# Patient Record
Sex: MALE | Race: BLACK OR AFRICAN AMERICAN
[De-identification: names, ages, dates, MRNs, and addresses within clinical notes are randomized per-mention and may not be internally consistent; named-entity substitution may affect disease eponyms.]

---

## 2018-10-04 ENCOUNTER — HOSPITAL ENCOUNTER (EMERGENCY)
Dept: HOSPITAL 80 - FED | Age: 68
Discharge: HOME | End: 2018-10-04
Payer: COMMERCIAL

## 2018-10-04 VITALS — SYSTOLIC BLOOD PRESSURE: 160 MMHG | DIASTOLIC BLOOD PRESSURE: 78 MMHG

## 2018-10-04 DIAGNOSIS — Z87.891: ICD-10-CM

## 2018-10-04 DIAGNOSIS — R74.8: ICD-10-CM

## 2018-10-04 DIAGNOSIS — I10: ICD-10-CM

## 2018-10-04 DIAGNOSIS — J40: Primary | ICD-10-CM

## 2018-10-04 LAB — PLATELET # BLD: 182 10^3/UL (ref 150–400)

## 2018-10-04 PROCEDURE — 71046 X-RAY EXAM CHEST 2 VIEWS: CPT

## 2018-10-04 PROCEDURE — 99285 EMERGENCY DEPT VISIT HI MDM: CPT

## 2018-10-04 PROCEDURE — 93005 ELECTROCARDIOGRAM TRACING: CPT

## 2018-10-04 NOTE — CPEKG
Test Reason : OPEN

Blood Pressure : ***/*** mmHG

Vent. Rate : 052 BPM     Atrial Rate : 051 BPM

   P-R Int : 145 ms          QRS Dur : 109 ms

    QT Int : 410 ms       P-R-T Axes : 076 062 058 degrees

   QTc Int : 382 ms

 

Sinus rhythm

Probable left atrial enlargement

Left ventricular hypertrophy

 

 

Confirmed by Irlanda Carpenter ([id number]) on 10/4/2018 4:10:53 PM

 

Referred By:             Confirmed By:Irlanda Carpenter

## 2018-10-04 NOTE — EDPHY
HPI/HX/ROS/PE/MDM


Narrative: 


CHIEF COMPLAINT: Chest pain





HISTORY OF PRESENT ILLNESS: 


The patient is a 69 y/o male with a history of hypertension complaining of right

-sided chest pain onset yesterday morning. After the chest pain started he took 

two Aspirin and then subsequently slept all day yesterday and last night, which 

is abnormal. The pain is currently localized to the right side of his chest and 

is a 6/10. He occasionally has mild shortness of breath. While walking around 

he feels mildly lightheaded. The pain does not change when he takes a deep 

breath. Denies history of DVT or PE, denies smoking although he did smoke 

cigars 15 years ago. He has also had right lower jaw pain for the last several 

months, which he has seen a dentist for. He denies taking antibiotics for the 

dental pain.





No fever, chills, palpitations, vomiting, diarrhea, urinary complaints, 

headache. 





REVIEW OF SYSTEMS:


Aside from elements discussed in the HPI, a comprehensive 10-system review of 

systems was reviewed and is negative.





PAST MEDICAL HISTORY: Hypertension, BPH  





SOCIAL HISTORY: Friend at bedside, employed, former cigar smoker





VITAL SIGNS: Reviewed by me


GENERAL: Well-developed, well-nourished, resting comfortably in no respiratory 

distress.


HEENT: Atraumatic. Eyes: No icterus, no injection. Mouth: moist mucous 

membranes.  No erythema or lesions. Neck: supple with no adenopathy.


LUNGS: Clear to auscultation bilaterally, no wheezes, rhonchi or rales.


CARDIAC: Regular rate and rhythm, no rubs, murmurs or gallops.


ABDOMEN: Soft, nontender, nondistended, bowel sounds normal.


BACK:  No CVA tenderness.


EXTREMITIES: No trauma. No edema.  Range of motion is normal throughout.


NEURO: Alert and oriented,  grossly nonfocal.  


SKIN: Warm and dry, no rash.


PSYCHIATRIC: Normal mentation, no agitation.





Portions of this note were transcribed by a medical scribe.  I personally 

performed a history, physical exam, medical decision making, and confirmed 

accuracy of information the transcribed note.





ED Course: 


The patient is a 69 y/o male with a history of hypertension complaining of right

-sided chest pain and fatigue onset yesterday morning. He has a normal exam 

including a regular heart and clear lungs. Labs, EKG, and chest x-ray ordered; 

324mg PO Aspirin administered.





0918: 12-LEAD EKG:  Please see the full report in [facility].  My 

interpretation: Sinus rhythm with a rate of 52, probable left atrial enlargement

, left ventricular hypertrophy.  No significant ST or T-wave changes.





1049: I reviewed patient's chest x-ray which reveals a bronchitis. Radiologist 

reading still pending.





1147: I spoke with the radiologist who agrees with my findings of bronchitis. 

Albuterol nebulizer administered prior to discharge.





Reassessed patient and discussed laboratory and imaging findings. I have 

prescribed him an albuterol inhaler. Return precautions provided; patient is 

comfortable with this plan. 





68-year-old male with history of hypertension with right-sided pain.  Pain is 

been present since yesterday.  He does report a mild URI as well.  Troponin 

negative.  D-dimer negative.  Chest x-ray demonstrates mild bronchitis.  

Patient better following a neb.  Patient also has a mildly elevated lipase.  

His possible at some of this right-sided pain is secondary to mild pancreatitis 

although the patient denies any nausea or vomiting.  Liver function tests are 

normal.  Patient will require follow-up.





MDM: 





After history and physical examination, the differential for chest pain was 

considered, including but not limited to, myocardial ischemia, acute coronary 

syndrome, pulmonary embolus, chest wall pain, pleural inflammation and 

pulmonary infectious causes.





- Data Points


Imaging Results: 


 Imaging Impressions





Chest X-Ray  10/04/18 10:04


Impression: Possible airways disease/bronchitis.


 


 











Imaging: Discussed imaging studies w/ On call Radiologist, I viewed and 

interpreted images myself


Laboratory Results: 


 Laboratory Results





 10/04/18 09:23 





 10/04/18 09:23 





 











  10/04/18 10/04/18 10/04/18





  09:23 09:23 09:23


 


WBC      5.37 10^3/uL 10^3/uL





     (3.80-9.50) 


 


RBC      5.60 10^6/uL 10^6/uL





     (4.40-6.38) 


 


Hgb      14.0 g/dL g/dL





     (13.7-17.5) 


 


Hct      44.9 % %





     (40.0-51.0) 


 


MCV      80.2 fL L fL





     (81.5-99.8) 


 


MCH      25.0 pg L pg





     (27.9-34.1) 


 


MCHC      31.2 g/dL L g/dL





     (32.4-36.7) 


 


RDW      14.5 % %





     (11.5-15.2) 


 


Plt Count      182 10^3/uL 10^3/uL





     (150-400) 


 


MPV      10.9 fL fL





     (8.7-11.7) 


 


Neut % (Auto)      67.0 % %





     (39.3-74.2) 


 


Lymph % (Auto)      22.9 % %





     (15.0-45.0) 


 


Mono % (Auto)      6.3 % %





     (4.5-13.0) 


 


Eos % (Auto)      3.2 % %





     (0.6-7.6) 


 


Baso % (Auto)      0.4 % %





     (0.3-1.7) 


 


Nucleat RBC Rel Count      0.0 % %





     (0.0-0.2) 


 


Absolute Neuts (auto)      3.60 10^3/uL 10^3/uL





     (1.70-6.50) 


 


Absolute Lymphs (auto)      1.23 10^3/uL 10^3/uL





     (1.00-3.00) 


 


Absolute Monos (auto)      0.34 10^3/uL 10^3/uL





     (0.30-0.80) 


 


Absolute Eos (auto)      0.17 10^3/uL 10^3/uL





     (0.03-0.40) 


 


Absolute Basos (auto)      0.02 10^3/uL 10^3/uL





     (0.02-0.10) 


 


Absolute Nucleated RBC      0.00 10^3/uL 10^3/uL





     (0-0.01) 


 


Immature Gran %      0.2 % %





     (0.0-1.1) 


 


Immature Gran #      0.01 10^3/uL 10^3/uL





     (0.00-0.10) 


 


D-Dimer    0.28 ug/mLFEU ug/mLFEU  





    (0.00-0.50)  


 


Sodium  140 mEq/L mEq/L    





   (135-145)   


 


Potassium  3.8 mEq/L mEq/L    





   (3.3-5.0)   


 


Chloride  105 mEq/L mEq/L    





   ()   


 


Carbon Dioxide  24 mEq/l mEq/l    





   (22-31)   


 


Anion Gap  11 mEq/L mEq/L    





   (8-16)   


 


BUN  12 mg/dL mg/dL    





   (7-23)   


 


Creatinine  1.2 mg/dL mg/dL    





   (0.7-1.3)   


 


Estimated GFR  60     





    


 


Glucose  106 mg/dL H mg/dL    





   ()   


 


Calcium  9.6 mg/dL mg/dL    





   (8.5-10.4)   


 


Total Bilirubin  0.5 mg/dL mg/dL    





   (0.1-1.4)   


 


Conjugated Bilirubin  0.1 mg/dL mg/dL    





   (0.0-0.5)   


 


Unconjugated Bilirubin  0.4 mg/dL mg/dL    





   (0.0-1.1)   


 


AST  26 IU/L IU/L    





   (17-59)   


 


ALT  35 IU/L IU/L    





   (21-72)   


 


Alkaline Phosphatase  57 IU/L IU/L    





   ()   


 


POC Troponin I      





    


 


Total Protein  7.1 g/dL g/dL    





   (6.3-8.2)   


 


Albumin  4.0 g/dL g/dL    





   (3.5-5.0)   


 


Lipase  437 IU/L H IU/L    





   ()   














  10/04/18





  09:23


 


WBC  





  


 


RBC  





  


 


Hgb  





  


 


Hct  





  


 


MCV  





  


 


MCH  





  


 


MCHC  





  


 


RDW  





  


 


Plt Count  





  


 


MPV  





  


 


Neut % (Auto)  





  


 


Lymph % (Auto)  





  


 


Mono % (Auto)  





  


 


Eos % (Auto)  





  


 


Baso % (Auto)  





  


 


Nucleat RBC Rel Count  





  


 


Absolute Neuts (auto)  





  


 


Absolute Lymphs (auto)  





  


 


Absolute Monos (auto)  





  


 


Absolute Eos (auto)  





  


 


Absolute Basos (auto)  





  


 


Absolute Nucleated RBC  





  


 


Immature Gran %  





  


 


Immature Gran #  





  


 


D-Dimer  





  


 


Sodium  





  


 


Potassium  





  


 


Chloride  





  


 


Carbon Dioxide  





  


 


Anion Gap  





  


 


BUN  





  


 


Creatinine  





  


 


Estimated GFR  





  


 


Glucose  





  


 


Calcium  





  


 


Total Bilirubin  





  


 


Conjugated Bilirubin  





  


 


Unconjugated Bilirubin  





  


 


AST  





  


 


ALT  





  


 


Alkaline Phosphatase  





  


 


POC Troponin I  0.01 ng/mL ng/mL





   (0.00-0.08) 


 


Total Protein  





  


 


Albumin  





  


 


Lipase  





  











Medications Given: 


 








Discontinued Medications





Albuterol (Proventil Neb)  3 ml IH EDNOW ONE


   Stop: 10/04/18 11:51


   Last Admin: 10/04/18 12:13 Dose:  3 ml


Aspirin (Aspirin)  324 mg PO EDNOW ONE


   Stop: 10/04/18 10:05


   Last Admin: 10/04/18 10:15 Dose:  324 mg





Point of Care Test Results: 


 Chemistry











  10/04/18





  09:23


 


POC Troponin I  0.01 ng/mL ng/mL





   (0.00-0.08) 














General


Time Seen by Provider: 10/04/18 09:40


Initial Vital Signs: 


 Initial Vital Signs











Temperature (C)  36.9 C   10/04/18 09:08


 


Heart Rate  65   10/04/18 09:08


 


Respiratory Rate  16   10/04/18 09:08


 


Blood Pressure  116/92 H  10/04/18 09:08


 


O2 Sat (%)  95   10/04/18 09:08








 











O2 Delivery Mode               Room Air














Allergies/Adverse Reactions: 


 





No Known Allergies Allergy (Unverified 10/04/18 09:07)


 








Home Medications: 














 Medication  Instructions  Recorded


 


Albuterol Hfa Anes Only [Proair 2 puffs IH QID #1 mdi 10/04/18





Hfa Icu (*)]  


 


Flomax  10/04/18














Departure





- Departure


Disposition: Home, Routine, Self-Care


Clinical Impression: 


 Bronchitis, Elevated lipase





Condition: Good


Instructions:  Acute Bronchitis (ED), Chest Pain (ED)


Additional Instructions: 


Use the albuterol inhaler as prescribed for your bronchitis.





Follow-up with your primary doctor within 72 hours.  





Return to the Emergency Department for fever, chest pain, shortness of breath, 

increasing pain or other worsening of condition.





Referrals: 


Harlan Valenzuela MD [Primary Care Provider] - As per Instructions


Prescriptions: 


Albuterol Hfa Anes Only [Proair Hfa Icu (*)] 2 puffs IH QID #1 mdi


Report Scribed for: Ilranda Carpenter


Report Scribed by: Zita Lazaro


Date of Report: 10/04/18


Time of Report: 09:47